# Patient Record
Sex: FEMALE | URBAN - METROPOLITAN AREA
[De-identification: names, ages, dates, MRNs, and addresses within clinical notes are randomized per-mention and may not be internally consistent; named-entity substitution may affect disease eponyms.]

---

## 2020-02-12 ENCOUNTER — APPOINTMENT (RX ONLY)
Dept: URBAN - METROPOLITAN AREA CLINIC 45 | Facility: CLINIC | Age: 60
Setting detail: DERMATOLOGY
End: 2020-02-12

## 2020-02-12 DIAGNOSIS — L81.4 OTHER MELANIN HYPERPIGMENTATION: ICD-10-CM

## 2020-02-12 DIAGNOSIS — L85.3 XEROSIS CUTIS: ICD-10-CM

## 2020-02-12 DIAGNOSIS — L63.8 OTHER ALOPECIA AREATA: ICD-10-CM

## 2020-02-12 PROCEDURE — 11900 INJECT SKIN LESIONS </W 7: CPT

## 2020-02-12 PROCEDURE — ? COUNSELING

## 2020-02-12 PROCEDURE — ? INJECTION

## 2020-02-12 PROCEDURE — 99203 OFFICE O/P NEW LOW 30 MIN: CPT | Mod: 25

## 2020-02-12 PROCEDURE — ? TREATMENT REGIMEN

## 2020-02-12 ASSESSMENT — LOCATION DETAILED DESCRIPTION DERM
LOCATION DETAILED: LEFT INFERIOR CENTRAL MALAR CHEEK
LOCATION DETAILED: RIGHT CENTRAL FRONTAL SCALP
LOCATION DETAILED: INFERIOR THORACIC SPINE

## 2020-02-12 ASSESSMENT — LOCATION SIMPLE DESCRIPTION DERM
LOCATION SIMPLE: SCALP
LOCATION SIMPLE: UPPER BACK
LOCATION SIMPLE: LEFT CHEEK

## 2020-02-12 ASSESSMENT — LOCATION ZONE DERM
LOCATION ZONE: SCALP
LOCATION ZONE: TRUNK
LOCATION ZONE: FACE

## 2020-02-12 NOTE — PROCEDURE: INJECTION
Route: IL
Detail Level: Zone
Post-Care Instructions: I reviewed with the patient in detail post-care instructions. Patient understands to keep the injection sites clean and call the clinic if there is any redness, swelling or pain.
Medication (2) And Associated J-Code Units: Triamcinolone acetonide, 10mg
Bill J-Code: yes
Dose Administered (Numbers Only): 1
Units: cc
Procedure Information: Please note that the numeric value listed in the Medication (1) and associated J-code units and Medication (2) and associated J-code units variables are j-code amounts and do not represent either the concentration or the total amount of the medications injected.  I strongly recommend selecting no to the Render J-code information in note question. This will allow your note to be more clear. If you are billing j-codes with your injection codes you need to document the total amount of the medication injected. This amount should match the j-code units. For example, if you are injecting Triamcinolone 40mg as an intramuscular injection you would select 40 for the dose field and mg for the units. This would allow you to document  with 4 units (40mg = 10mg x 4). The total volume is not used to calculate j-codes only the amount of the medication administered.
Consent: The risks of the medication were reviewed with the patient.
Medication (1) And Associated J-Code Units: 5-Fluorouracil, 500mg
Hide Second Medication?: No

## 2020-02-12 NOTE — PROCEDURE: TREATMENT REGIMEN
Plan: Location: Posterior scalp\\nToday’s Treatment: injected with 1cc of 5FU/ILK 40\\nPhotos taken today\\n\\nPt is here for hair loss on scalp that her  noticed\\nPt discussed that her hair loss was sudden and is very concerned.\\nToday pt has circular bald patches throughout her scalp.\\nThis has been ongoing since November 2019 however states that she’s noticed her hair growing back \\n\\nDiscussed with patient this is also an auto-immune condition usually genetic, onset by increased stress and a lowered immune system.\\nAdvised patient to that we can treatment with 5FU/ILK 40 to help relieve inflammation.\\nDiscussed with pt to start using Rogaine to help produce hair growth from the external as well---discussed hair may fall out for the first few weeks. \\nAlso advised patient to try and keep stress under control in order to prevent further breakouts of hair loss\\Jose Addition, will have her complete labs for TSH, CBC, CMP, Iron, Ferritin, T4 total because there may be a coorelation to her hair loss\\nF/u as needed
Detail Level: Zone